# Patient Record
Sex: FEMALE | HISPANIC OR LATINO
[De-identification: names, ages, dates, MRNs, and addresses within clinical notes are randomized per-mention and may not be internally consistent; named-entity substitution may affect disease eponyms.]

---

## 2024-05-08 ENCOUNTER — APPOINTMENT (OUTPATIENT)
Dept: ORTHOPEDIC SURGERY | Facility: CLINIC | Age: 64
End: 2024-05-08
Payer: MEDICAID

## 2024-05-08 DIAGNOSIS — Z78.9 OTHER SPECIFIED HEALTH STATUS: ICD-10-CM

## 2024-05-08 PROBLEM — Z00.00 ENCOUNTER FOR PREVENTIVE HEALTH EXAMINATION: Status: ACTIVE | Noted: 2024-05-08

## 2024-05-08 PROCEDURE — 99204 OFFICE O/P NEW MOD 45 MIN: CPT

## 2024-05-08 PROCEDURE — 73562 X-RAY EXAM OF KNEE 3: CPT | Mod: 50

## 2024-05-08 NOTE — DISCUSSION/SUMMARY
[de-identified] :   - We discussed their diagnosis and treatment options at length including the risks and benefits of both surgical and non-surgical options.  - We will continue conservative treatment with icing, anti-inflammatory medication, and PT   - The patient was provided with a prescription to work on hip ER/abductors strengthening along with quad/hamstring stretches and strengthening   - The patient was advised to let pain guide the gradual advancement of activities.   - Follow up as needed in 6 weeks to re-evaluate.

## 2024-05-08 NOTE — HISTORY OF PRESENT ILLNESS
[Dull/Aching] : dull/aching [Localized] : localized [Intermittent] : intermittent [Nothing helps with pain getting better] : Nothing helps with pain getting better [Exercising] : exercising [Retired] : Work status: retired [9] : 9 [de-identified] : Date of Injury/Onset: 05/08/2024 :AMY CESAR , a 64 year old female, presents today for bilateral knee pain onset 3 years patient states pain gets worst with going up and down stairs and squatting.  Rest is sometimes helpful has not taken any medication or nsaids for relief does indicate poping and cracking at times With Activity: 0/10  Rest : 9/10  Mechanism of injury: none  This is NOT a Work Related Injury being treated under Worker's Compensation. na  This is NOT an athletic injury occurring associated with an interscholastic or organized sports team. na  Quality of symptoms: limited motion  Improves with: none at this time  Worse with: waking standing up and down stairs  Prior treatment: pcp  Prior Imaging: xrays  Reports Available For Review Today: Out of work/sport: none  School/Sport/Position/Occupation: retired  Personal goal: Additional Information: anterior knee pain R worse then left has not been see or evaluated by any MD No formal tx , therapy, injection or sx.  [] : Patient is currently injured and not playing sports: no

## 2024-05-08 NOTE — DATA REVIEWED
[FreeTextEntry1] : Bilateral X -Ray Examination of the KNEE (4 views): there are no fractures, subluxations or dislocations.

## 2024-05-08 NOTE — IMAGING
[de-identified] : LEFT KNEE  Inspection:  minimal effusion  Palpation: medial facet of the patella tenderness   Knee Range of Motion:  0-135  Strength: 5/5 Quadriceps strength, 5/5 Hamstring strength, 4/5 Hip Abductor strength  Neurological: light touch is intact throughout  Ligament Stability and Special Tests:   McMurrays: neg  Lachman: neg  Pivot Shift: neg  Posterior Drawer: neg  Valgus: neg  Varus: neg  Patella Apprehension: neg  Patella Maltracking: neg  RIGHT KNEE  Inspection:  minimal effusion  Palpation: medial facet of the patella tenderness   Knee Range of Motion:  0-135  Strength: 5/5 Quadriceps strength, 5/5 Hamstring strength, 4/5 Hip Abductor strength  Neurological: light touch is intact throughout  Ligament Stability and Special Tests:   McMurrays: neg  Lachman: neg  Pivot Shift: neg  Posterior Drawer: neg  Valgus: neg  Varus: neg  Patella Apprehension: neg  Patella Maltracking: neg

## 2024-06-26 ENCOUNTER — APPOINTMENT (OUTPATIENT)
Dept: ORTHOPEDIC SURGERY | Facility: CLINIC | Age: 64
End: 2024-06-26
Payer: MEDICAID

## 2024-06-26 DIAGNOSIS — M22.42 CHONDROMALACIA PATELLAE, LEFT KNEE: ICD-10-CM

## 2024-06-26 DIAGNOSIS — M22.41 CHONDROMALACIA PATELLAE, RIGHT KNEE: ICD-10-CM

## 2024-06-26 PROCEDURE — 99214 OFFICE O/P EST MOD 30 MIN: CPT

## 2024-06-26 RX ORDER — MELOXICAM 7.5 MG/1
7.5 TABLET ORAL
Qty: 30 | Refills: 0 | Status: ACTIVE | COMMUNITY
Start: 2024-05-08 | End: 1900-01-01

## 2024-09-04 ENCOUNTER — APPOINTMENT (OUTPATIENT)
Dept: ORTHOPEDIC SURGERY | Facility: CLINIC | Age: 64
End: 2024-09-04

## 2024-09-25 ENCOUNTER — APPOINTMENT (OUTPATIENT)
Dept: ORTHOPEDIC SURGERY | Facility: CLINIC | Age: 64
End: 2024-09-25
Payer: MEDICAID

## 2024-09-25 DIAGNOSIS — M22.41 CHONDROMALACIA PATELLAE, RIGHT KNEE: ICD-10-CM

## 2024-09-25 DIAGNOSIS — M22.42 CHONDROMALACIA PATELLAE, LEFT KNEE: ICD-10-CM

## 2024-09-25 PROCEDURE — 99214 OFFICE O/P EST MOD 30 MIN: CPT

## 2024-09-25 RX ORDER — MELOXICAM 7.5 MG/1
7.5 TABLET ORAL
Qty: 30 | Refills: 2 | Status: ACTIVE | COMMUNITY
Start: 2024-09-25 | End: 1900-01-01

## 2024-09-25 NOTE — DISCUSSION/SUMMARY
[de-identified] :   - We discussed their diagnosis and treatment options at length including the risks and benefits of both surgical and non-surgical options.  - We will continue conservative treatment with icing, anti-inflammatory medication, and PT   - The patient was provided with a prescription to work on hip ER/abductors strengthening along with quad/hamstring stretches and strengthening   - The patient was advised to let pain guide the gradual advancement of activities.   - Follow up as needed in 6 weeks to re-evaluate. ****** 6/26 B/L PFS Continue PT, Foam roller, lateral column lengthening HEP, Mobic RTC 6 weeks  next viist if no improvement consider MR *** B/L PFS Continue PT, Foam roller, lateral column lengthening HEP, Mobic RTC 6 weeks  next visit if no improvement consider MR of both knees if symptomatic

## 2024-09-25 NOTE — HISTORY OF PRESENT ILLNESS
[9] : 9 [Dull/Aching] : dull/aching [Localized] : localized [Intermittent] : intermittent [Nothing helps with pain getting better] : Nothing helps with pain getting better [Exercising] : exercising [Retired] : Work status: retired [de-identified] : Date of Injury/Onset: 05/08/2024 :AMY CESAR , a 64 year old female, presents today for bilateral knee pain onset 3 years patient states pain gets worst with going up and down stairs and squatting.  Rest is sometimes helpful has not taken any medication or nsaids for relief does indicate poping and cracking at times With Activity: 0/10  Rest : 9/10  Mechanism of injury: none  This is NOT a Work Related Injury being treated under Worker's Compensation. na  This is NOT an athletic injury occurring associated with an interscholastic or organized sports team. na  Quality of symptoms: limited motion  Improves with: none at this time  Worse with: waking standing up and down stairs  Prior treatment: pcp  Prior Imaging: xrays  Reports Available For Review Today: Out of work/sport: none  School/Sport/Position/Occupation: retired  Personal goal: Additional Information: anterior knee pain R worse then left has not been see or evaluated by any MD No formal tx , therapy, injection or sx.   06/26/2024 :AMY CESAR , a 64 year old female, presents today for bilateral knee pain is presently doing PT has some improvement she has a pain level of 5/10 at rest and 5/10 with movement. Continues use of Meloxicam as needed. Pt states pain was alleviated completely with Mobic but then she stopped it and the pain recurred.   09/25/2024 AMY 64 year F is here today to follow up on B/L knees. Patient reports she did PT, HEP and took Mobic, states pain returned after completing all that. Patient is here today for new rx. denies no new injury [] : Patient is currently injured and not playing sports: no

## 2024-09-25 NOTE — IMAGING
[de-identified] : LEFT KNEE  Inspection:  minimal effusion  Palpation: medial facet of the patella tenderness   Knee Range of Motion:  0-135  Strength: 5/5 Quadriceps strength, 5/5 Hamstring strength, 4/5 Hip Abductor strength  Neurological: light touch is intact throughout  Ligament Stability and Special Tests:   McMurrays: neg  Lachman: neg  Pivot Shift: neg  Posterior Drawer: neg  Valgus: neg  Varus: neg  Patella Apprehension: neg  Patella Maltracking: neg  RIGHT KNEE  Inspection:  minimal effusion  Palpation: medial facet of the patella tenderness   Knee Range of Motion:  0-135  Strength: 5/5 Quadriceps strength, 5/5 Hamstring strength, 4/5 Hip Abductor strength  Neurological: light touch is intact throughout  Ligament Stability and Special Tests:   McMurrays: neg  Lachman: neg  Pivot Shift: neg  Posterior Drawer: neg  Valgus: neg  Varus: neg  Patella Apprehension: neg  Patella Maltracking: neg

## 2024-11-26 ENCOUNTER — APPOINTMENT (OUTPATIENT)
Dept: ORTHOPEDIC SURGERY | Facility: CLINIC | Age: 64
End: 2024-11-26
Payer: COMMERCIAL

## 2024-11-26 DIAGNOSIS — M25.461 EFFUSION, RIGHT KNEE: ICD-10-CM

## 2024-11-26 DIAGNOSIS — M25.462 EFFUSION, LEFT KNEE: ICD-10-CM

## 2024-11-26 PROCEDURE — 99214 OFFICE O/P EST MOD 30 MIN: CPT

## 2024-12-17 ENCOUNTER — APPOINTMENT (OUTPATIENT)
Dept: ORTHOPEDIC SURGERY | Facility: CLINIC | Age: 64
End: 2024-12-17
Payer: COMMERCIAL

## 2024-12-17 DIAGNOSIS — M22.42 CHONDROMALACIA PATELLAE, LEFT KNEE: ICD-10-CM

## 2024-12-17 DIAGNOSIS — M22.41 CHONDROMALACIA PATELLAE, RIGHT KNEE: ICD-10-CM

## 2024-12-17 PROCEDURE — 99214 OFFICE O/P EST MOD 30 MIN: CPT

## 2024-12-17 RX ORDER — DICLOFENAC SODIUM 75 MG/1
75 TABLET, DELAYED RELEASE ORAL
Qty: 60 | Refills: 0 | Status: ACTIVE | COMMUNITY
Start: 2024-12-17 | End: 1900-01-01

## 2025-02-04 ENCOUNTER — APPOINTMENT (OUTPATIENT)
Dept: ORTHOPEDIC SURGERY | Facility: CLINIC | Age: 65
End: 2025-02-04